# Patient Record
Sex: FEMALE | Race: WHITE | Employment: FULL TIME | URBAN - METROPOLITAN AREA
[De-identification: names, ages, dates, MRNs, and addresses within clinical notes are randomized per-mention and may not be internally consistent; named-entity substitution may affect disease eponyms.]

---

## 2017-08-19 ENCOUNTER — HOSPITAL ENCOUNTER (OUTPATIENT)
Dept: MAMMOGRAPHY | Facility: HOSPITAL | Age: 57
Discharge: HOME | End: 2017-08-19
Attending: OBSTETRICS & GYNECOLOGY
Payer: COMMERCIAL

## 2017-08-19 DIAGNOSIS — Z12.39 SCREENING BREAST EXAMINATION: ICD-10-CM

## 2017-08-19 PROCEDURE — 77067 SCR MAMMO BI INCL CAD: CPT | Performed by: OBSTETRICS & GYNECOLOGY

## 2017-09-30 ENCOUNTER — HOSPITAL ENCOUNTER (OUTPATIENT)
Dept: CT IMAGING | Facility: HOSPITAL | Age: 57
End: 2017-09-30
Attending: UROLOGY
Payer: COMMERCIAL

## 2017-09-30 ENCOUNTER — HOSPITAL ENCOUNTER (OUTPATIENT)
Dept: CT IMAGING | Facility: HOSPITAL | Age: 57
Discharge: HOME | End: 2017-09-30
Attending: UROLOGY
Payer: COMMERCIAL

## 2017-09-30 DIAGNOSIS — R10.9 LEFT FLANK PAIN: ICD-10-CM

## 2017-09-30 DIAGNOSIS — N20.0 RENAL CALCULUS, LEFT: ICD-10-CM

## 2017-09-30 PROCEDURE — 74176 CT ABD & PELVIS W/O CONTRAST: CPT | Performed by: UROLOGY

## 2018-06-05 ENCOUNTER — HOSPITAL ENCOUNTER (OUTPATIENT)
Dept: MAMMOGRAPHY | Facility: HOSPITAL | Age: 58
Discharge: HOME | End: 2018-06-05
Attending: SURGERY
Payer: COMMERCIAL

## 2018-06-05 DIAGNOSIS — N64.4 BREAST PAIN, LEFT: ICD-10-CM

## 2018-06-05 PROCEDURE — 77061 BREAST TOMOSYNTHESIS UNI: CPT | Performed by: SURGERY

## 2018-06-05 PROCEDURE — 76641 ULTRASOUND BREAST COMPLETE: CPT | Performed by: SURGERY

## 2018-06-05 PROCEDURE — 77065 DX MAMMO INCL CAD UNI: CPT | Performed by: SURGERY

## 2018-08-02 PROCEDURE — 88305 TISSUE EXAM BY PATHOLOGIST: CPT | Performed by: SURGERY

## 2019-02-18 PROBLEM — Z87.442 HISTORY OF KIDNEY STONES: Status: ACTIVE | Noted: 2019-02-18

## 2019-02-18 PROCEDURE — 87086 URINE CULTURE/COLONY COUNT: CPT | Performed by: OBSTETRICS & GYNECOLOGY

## 2019-05-03 ENCOUNTER — HOSPITAL ENCOUNTER (OUTPATIENT)
Dept: MAMMOGRAPHY | Facility: HOSPITAL | Age: 59
Discharge: HOME | End: 2019-05-03
Attending: OBSTETRICS & GYNECOLOGY
Payer: COMMERCIAL

## 2019-05-03 DIAGNOSIS — N63.0 LUMP OR MASS IN BREAST: ICD-10-CM

## 2019-05-03 PROCEDURE — 77062 BREAST TOMOSYNTHESIS BI: CPT | Performed by: OBSTETRICS & GYNECOLOGY

## 2019-05-03 PROCEDURE — 77065 DX MAMMO INCL CAD UNI: CPT | Performed by: OBSTETRICS & GYNECOLOGY

## 2019-05-03 PROCEDURE — 76642 ULTRASOUND BREAST LIMITED: CPT | Performed by: OBSTETRICS & GYNECOLOGY

## 2019-05-03 PROCEDURE — 77066 DX MAMMO INCL CAD BI: CPT | Performed by: OBSTETRICS & GYNECOLOGY

## 2019-05-03 PROCEDURE — 77061 BREAST TOMOSYNTHESIS UNI: CPT | Performed by: OBSTETRICS & GYNECOLOGY

## 2020-06-24 ENCOUNTER — APPOINTMENT (OUTPATIENT)
Dept: LAB | Age: 60
End: 2020-06-24
Attending: OBSTETRICS & GYNECOLOGY
Payer: COMMERCIAL

## 2020-06-24 DIAGNOSIS — R63.5 UNINTENDED WEIGHT GAIN: ICD-10-CM

## 2020-06-24 PROCEDURE — 87624 HPV HI-RISK TYP POOLED RSLT: CPT | Performed by: NURSE PRACTITIONER

## 2020-06-24 PROCEDURE — 88175 CYTOPATH C/V AUTO FLUID REDO: CPT | Performed by: NURSE PRACTITIONER

## 2020-06-24 PROCEDURE — 36415 COLL VENOUS BLD VENIPUNCTURE: CPT

## 2020-06-24 PROCEDURE — 84443 ASSAY THYROID STIM HORMONE: CPT

## 2020-06-25 ENCOUNTER — HOSPITAL ENCOUNTER (OUTPATIENT)
Dept: MAMMOGRAPHY | Age: 60
Discharge: HOME | End: 2020-06-25
Attending: NURSE PRACTITIONER
Payer: COMMERCIAL

## 2020-06-25 DIAGNOSIS — Z12.31 ENCOUNTER FOR SCREENING MAMMOGRAM FOR MALIGNANT NEOPLASM OF BREAST: ICD-10-CM

## 2020-06-25 PROCEDURE — 77067 SCR MAMMO BI INCL CAD: CPT | Performed by: NURSE PRACTITIONER

## 2020-06-25 PROCEDURE — 77063 BREAST TOMOSYNTHESIS BI: CPT | Performed by: NURSE PRACTITIONER

## 2020-07-02 ENCOUNTER — HOSPITAL ENCOUNTER (OUTPATIENT)
Dept: MAMMOGRAPHY | Facility: HOSPITAL | Age: 60
Discharge: HOME | End: 2020-07-02
Attending: NURSE PRACTITIONER
Payer: COMMERCIAL

## 2020-07-02 DIAGNOSIS — R92.2 INCONCLUSIVE MAMMOGRAM: ICD-10-CM

## 2020-07-02 PROCEDURE — 77061 BREAST TOMOSYNTHESIS UNI: CPT | Performed by: NURSE PRACTITIONER

## 2020-07-02 PROCEDURE — 77065 DX MAMMO INCL CAD UNI: CPT | Performed by: NURSE PRACTITIONER

## 2020-07-02 PROCEDURE — 76642 ULTRASOUND BREAST LIMITED: CPT | Performed by: NURSE PRACTITIONER

## 2021-01-31 ENCOUNTER — IMMUNIZATION (OUTPATIENT)
Dept: LAB | Facility: HOSPITAL | Age: 61
End: 2021-01-31
Attending: EMERGENCY MEDICINE
Payer: COMMERCIAL

## 2021-01-31 DIAGNOSIS — Z23 NEED FOR VACCINATION: Primary | ICD-10-CM

## 2021-01-31 PROCEDURE — 0011A SARSCOV2 VAC 100MCG/0.5ML IM: CPT

## 2021-02-28 ENCOUNTER — IMMUNIZATION (OUTPATIENT)
Dept: LAB | Facility: HOSPITAL | Age: 61
End: 2021-02-28
Attending: EMERGENCY MEDICINE
Payer: COMMERCIAL

## 2021-02-28 DIAGNOSIS — Z23 NEED FOR VACCINATION: Primary | ICD-10-CM

## 2021-02-28 PROCEDURE — 0012A SARSCOV2 VAC 100MCG/0.5ML IM: CPT

## 2021-04-13 PROBLEM — R10.13 EPIGASTRIC PAIN: Status: ACTIVE | Noted: 2021-04-13

## 2021-04-13 PROBLEM — Z86.0100 PERSONAL HISTORY OF COLONIC POLYPS: Status: ACTIVE | Noted: 2021-04-13

## 2021-04-13 PROBLEM — R12 HEARTBURN: Status: ACTIVE | Noted: 2021-04-13

## 2021-04-13 PROBLEM — Z86.010 PERSONAL HISTORY OF COLONIC POLYPS: Status: ACTIVE | Noted: 2021-04-13

## 2021-04-13 PROBLEM — R11.2 NAUSEA AND VOMITING: Status: ACTIVE | Noted: 2021-04-13

## 2021-05-04 ENCOUNTER — HOSPITAL ENCOUNTER (OUTPATIENT)
Dept: MAMMOGRAPHY | Facility: HOSPITAL | Age: 61
Discharge: HOME | End: 2021-05-04
Attending: OBSTETRICS & GYNECOLOGY
Payer: COMMERCIAL

## 2021-05-04 DIAGNOSIS — N63.10 BILATERAL BREAST LUMP: ICD-10-CM

## 2021-05-04 DIAGNOSIS — N63.20 BILATERAL BREAST LUMP: ICD-10-CM

## 2021-05-04 PROCEDURE — 77066 DX MAMMO INCL CAD BI: CPT | Performed by: OBSTETRICS & GYNECOLOGY

## 2021-05-04 PROCEDURE — 77062 BREAST TOMOSYNTHESIS BI: CPT | Performed by: OBSTETRICS & GYNECOLOGY

## 2021-05-04 PROCEDURE — 76642 ULTRASOUND BREAST LIMITED: CPT | Performed by: OBSTETRICS & GYNECOLOGY

## 2021-06-11 ENCOUNTER — LAB ENCOUNTER (OUTPATIENT)
Dept: LAB | Facility: HOSPITAL | Age: 61
End: 2021-06-11
Attending: NURSE PRACTITIONER
Payer: COMMERCIAL

## 2021-06-11 DIAGNOSIS — Z01.818 PREOP EXAMINATION: ICD-10-CM

## 2021-06-14 PROBLEM — Z86.0101 HISTORY OF ADENOMATOUS POLYP OF COLON: Status: ACTIVE | Noted: 2021-06-14

## 2021-06-14 PROBLEM — K29.30 CHRONIC SUPERFICIAL GASTRITIS WITHOUT BLEEDING: Status: ACTIVE | Noted: 2021-06-14

## 2021-06-14 PROBLEM — K31.7 POLYP OF STOMACH AND DUODENUM: Status: ACTIVE | Noted: 2021-06-14

## 2021-06-14 PROBLEM — K21.00 GASTRO-ESOPHAGEAL REFLUX DISEASE WITH ESOPHAGITIS, WITHOUT BLEEDING: Status: ACTIVE | Noted: 2021-06-14

## 2021-06-14 PROBLEM — Z80.0 FAMILY HISTORY OF COLON CANCER: Status: ACTIVE | Noted: 2021-06-14

## 2021-06-14 PROBLEM — R12 CHRONIC HEARTBURN: Status: ACTIVE | Noted: 2021-06-14

## 2021-06-14 PROBLEM — D12.3 BENIGN NEOPLASM OF TRANSVERSE COLON: Status: ACTIVE | Noted: 2021-06-14

## 2021-06-14 PROBLEM — Z86.010 HISTORY OF ADENOMATOUS POLYP OF COLON: Status: ACTIVE | Noted: 2021-06-14

## 2021-06-14 PROBLEM — R10.13 ABDOMINAL PAIN, EPIGASTRIC: Status: ACTIVE | Noted: 2021-06-14

## 2021-06-14 PROBLEM — D12.2 BENIGN NEOPLASM OF ASCENDING COLON: Status: ACTIVE | Noted: 2021-06-14

## 2021-06-14 PROBLEM — D12.4 BENIGN NEOPLASM OF DESCENDING COLON: Status: ACTIVE | Noted: 2021-06-14

## 2021-06-29 ENCOUNTER — HOSPITAL ENCOUNTER (OUTPATIENT)
Dept: BONE DENSITY | Age: 61
Discharge: HOME | End: 2021-06-29
Attending: NURSE PRACTITIONER
Payer: COMMERCIAL

## 2021-06-29 DIAGNOSIS — Z13.820 SCREENING FOR OSTEOPOROSIS: ICD-10-CM

## 2021-06-29 PROCEDURE — 77080 DXA BONE DENSITY AXIAL: CPT | Performed by: NURSE PRACTITIONER

## 2021-08-13 ENCOUNTER — LAB ENCOUNTER (OUTPATIENT)
Dept: LAB | Facility: HOSPITAL | Age: 61
End: 2021-08-13
Attending: INTERNAL MEDICINE
Payer: COMMERCIAL

## 2021-08-13 DIAGNOSIS — Z01.818 PRE-OP TESTING: ICD-10-CM

## 2021-08-13 LAB — SARS-COV-2 RNA RESP QL NAA+PROBE: NOT DETECTED

## 2022-01-06 ENCOUNTER — HOSPITAL ENCOUNTER (OUTPATIENT)
Dept: MAMMOGRAPHY | Facility: HOSPITAL | Age: 62
Discharge: HOME | End: 2022-01-06
Attending: OBSTETRICS & GYNECOLOGY
Payer: COMMERCIAL

## 2022-01-06 DIAGNOSIS — N63.15 BREAST LUMP ON RIGHT SIDE AT 6 O'CLOCK POSITION: ICD-10-CM

## 2022-01-06 PROCEDURE — 77065 DX MAMMO INCL CAD UNI: CPT | Performed by: OBSTETRICS & GYNECOLOGY

## 2022-01-06 PROCEDURE — 77061 BREAST TOMOSYNTHESIS UNI: CPT | Performed by: OBSTETRICS & GYNECOLOGY

## 2022-01-14 NOTE — PROGRESS NOTES
Message left on voice mail to affirm that she has seen message to return for repeat breast exam in 3-4 months.

## 2022-06-22 ENCOUNTER — HOSPITAL ENCOUNTER (OUTPATIENT)
Dept: MAMMOGRAPHY | Facility: HOSPITAL | Age: 62
Discharge: HOME | End: 2022-06-22
Attending: NURSE PRACTITIONER
Payer: COMMERCIAL

## 2022-06-22 DIAGNOSIS — Z12.31 ENCOUNTER FOR SCREENING MAMMOGRAM FOR BREAST CANCER: ICD-10-CM

## 2022-06-22 PROCEDURE — 77067 SCR MAMMO BI INCL CAD: CPT | Performed by: NURSE PRACTITIONER

## 2022-06-22 PROCEDURE — 77063 BREAST TOMOSYNTHESIS BI: CPT | Performed by: NURSE PRACTITIONER

## 2023-01-06 PROCEDURE — 87086 URINE CULTURE/COLONY COUNT: CPT

## 2023-05-18 PROCEDURE — 87624 HPV HI-RISK TYP POOLED RSLT: CPT

## 2023-05-18 PROCEDURE — 88175 CYTOPATH C/V AUTO FLUID REDO: CPT

## 2023-06-23 ENCOUNTER — HOSPITAL ENCOUNTER (OUTPATIENT)
Dept: MAMMOGRAPHY | Facility: HOSPITAL | Age: 63
Discharge: HOME | End: 2023-06-23
Payer: COMMERCIAL

## 2023-06-23 DIAGNOSIS — Z12.31 ENCOUNTER FOR SCREENING MAMMOGRAM FOR MALIGNANT NEOPLASM OF BREAST: ICD-10-CM

## 2023-06-23 PROCEDURE — 77067 SCR MAMMO BI INCL CAD: CPT

## 2023-06-23 PROCEDURE — 77063 BREAST TOMOSYNTHESIS BI: CPT

## 2024-04-09 ENCOUNTER — TELEPHONE (OUTPATIENT)
Facility: CLINIC | Age: 64
End: 2024-04-09

## 2024-04-09 DIAGNOSIS — Z12.31 SCREENING MAMMOGRAM FOR BREAST CANCER: Primary | ICD-10-CM

## 2024-04-09 NOTE — TELEPHONE ENCOUNTER
Nya Dasilva,    Your screening mammogram is ordered. That is to be done after your annual exam.    Doctor STEEL

## 2024-05-22 ENCOUNTER — OFFICE VISIT (OUTPATIENT)
Facility: CLINIC | Age: 64
End: 2024-05-22

## 2024-05-22 VITALS
HEIGHT: 64 IN | BODY MASS INDEX: 28.34 KG/M2 | WEIGHT: 166 LBS | SYSTOLIC BLOOD PRESSURE: 122 MMHG | DIASTOLIC BLOOD PRESSURE: 76 MMHG

## 2024-05-22 DIAGNOSIS — Z00.00 ANNUAL PHYSICAL EXAM: ICD-10-CM

## 2024-05-22 DIAGNOSIS — Z12.6 BLADDER CANCER SCREENING: Primary | ICD-10-CM

## 2024-05-22 DIAGNOSIS — N95.2 VAGINAL ATROPHY: ICD-10-CM

## 2024-05-22 DIAGNOSIS — N84.1 ENDOCERVICAL POLYP: ICD-10-CM

## 2024-05-22 LAB
BILIRUBIN: NEGATIVE
GLUCOSE (URINE DIPSTICK): NEGATIVE MG/DL
KETONES (URINE DIPSTICK): NEGATIVE MG/DL
LEUKOCYTES: NEGATIVE
NITRITE, URINE: NEGATIVE
OCCULT BLOOD: NEGATIVE
PH, URINE: 7 (ref 4.5–8)
PROTEIN (URINE DIPSTICK): NEGATIVE MG/DL
SPECIFIC GRAVITY: 1.01 (ref 1–1.03)
UROBILINOGEN,SEMI-QN: 0.2 MG/DL (ref 0–1.9)

## 2024-05-22 PROCEDURE — 87624 HPV HI-RISK TYP POOLED RSLT: CPT | Performed by: OBSTETRICS & GYNECOLOGY

## 2024-05-22 PROCEDURE — 99396 PREV VISIT EST AGE 40-64: CPT | Performed by: OBSTETRICS & GYNECOLOGY

## 2024-05-22 PROCEDURE — 88175 CYTOPATH C/V AUTO FLUID REDO: CPT | Performed by: OBSTETRICS & GYNECOLOGY

## 2024-05-22 PROCEDURE — 81003 URINALYSIS AUTO W/O SCOPE: CPT | Performed by: OBSTETRICS & GYNECOLOGY

## 2024-05-22 NOTE — PROGRESS NOTES
GYN H&P     Genetic questionnaire reviewed with the patient and she will be referred for genetic counseling if the questionnaire had any positive results.    The [facility] intake form was also reviewed regarding contraception, menstrual periods, urinary health, and vaginal / sexual health    2024  12:31 PM    Chief Complaint   Patient presents with    Physical     No concerns.       HPI: Nya is a 64 year old  Patient's last menstrual period was 2011.  (contraception: na) here for her annual gyn exam.     She has no complaints.  Not using E cream much - hardly sexually active.  No VV complaints  Denies any pelvic or breast complaints.      Second DEXA also normal    Feels something in right SBE - not a lump.     Previous encounters and chart reviewed.     OB History    Para Term  AB Living   1 1 0 1   1   SAB IAB Ectopic Multiple Live Births         1 2      # Outcome Date GA Lbr Oswaldo/2nd Weight Sex Type Anes PTL Lv   1A  90 36w0d  5 lb 1 oz (2.296 kg) F NORMAL SPONT   HOME   1B  90 36w0d  5 lb 4 oz (2.381 kg) F NORMAL SPONT   DEC       GYN hx:   Menarche: 13  Use of Birth Control (if yes, specify type): Postmenopausal  Pap Date: 23  Pap Result Notes: neg/neg 2020 Neg/Neg      ( neg/neg,  neg/neg,  neg)  Follow Up Recommendation:       Past Medical History:    Abdominal hernia    CT scan very small hiatal hernia    Arthritis    Large toe joint replacement both feet    Bloating    Sometimes after meals    Calculus of kidney    Several kidney stone surgeries over the years    Chronic cough    Sometimes after eating    Decorative tattoo    Left wrist    Diarrhea, unspecified    Sometimes    Heartburn    After meals    Indigestion    Severe pain behind breastbone    Kidney stones    Nausea    Sometimes after meals    Sleep disturbance    Sometimes    Vomiting    Sometimes after meals    Wears glasses    Glasses     Past  Surgical History:   Procedure Laterality Date    Cholecystectomy  2009    Surgery    Colonoscopy N/A 8/2/2018    Procedure: COLONOSCOPY, POSSIBLE BIOPSY, POSSIBLE POLYPECTOMY 13665;  Surgeon: MILVIA Smith MD;  Location: [facility]    Colonoscopy  06/14/2021    endoscopy also    Colonoscopy,biopsy N/A 8/6/2015    Procedure: COLONOSCOPY, POSSIBLE BIOPSY, POSSIBLE POLYPECTOMY 17364;  Surgeon: MILVIA Smith MD;  Location: [facility]    Lithotripsy Right 03/25/2021    Casey biopsy stereo nodule 2 site bilat (cpt=19081/99399)  2010    BENIGN    Casey biopsy stereo nodule 2 site left (cpt=19081/24997)  2008    benign    Other surgical history Right 03/29/2021    kidney stone remnant removal    Reduction left  12/12'    Reduction right  12/12'    Removal gallbladder       Allergies   Allergen Reactions    Pseudoephedrine RASH     Current Outpatient Medications on File Prior to Visit   Medication Sig Dispense Refill    Multiple Vitamin (ONE-DAILY MULTI VITAMINS) Oral Tab Take 1 tablet by mouth daily.      Cyanocobalamin (VITAMIN B-12 OR) Take by mouth.      Ascorbic Acid (VITAMIN C OR) Take by mouth.      Cholecalciferol 125 MCG (5000 UT) Oral Tab Take 1 tablet (5,000 Units total) by mouth daily.      PEG 3350-KCl-Na Bicarb-NaCl 420 g Oral Recon Soln Take as directed by physician (Patient not taking: Reported on 5/22/2024) 4000 mL 0    methylPREDNISolone 4 MG Oral Tablet Therapy Pack Take 1 tablet by mouth As Directed.      estradiol 0.1 MG/GM Vaginal Cream Apply a dime-sized amount to the vaginal introitus every day for 2 weeks, then reduce dose to 2 times per week. 42.5 g 1    Cyanocobalamin (B-12 COMPLIANCE INJECTION IJ) Inject as directed every 14 (fourteen) days.       No current facility-administered medications on file prior to visit.     Family History   Problem Relation Age of Onset    Cancer Father         Bladder, Bone and Prostate Ca    Prostate Cancer Father         60    Hypertension Father         50    Cancer  Mother 63        Colon Ca    Colon Cancer Mother         63    Diabetes Mother         diabetes    Heart Attack Mother         45    Cancer Paternal Aunt 52        Breast CA    Breast Cancer Paternal Aunt         unknown    Cancer Paternal Aunt 65        Breast Ca    Cancer Paternal Cousin 55        Breast Ca    Diabetes Paternal Grandmother         diabetes    Hypertension Brother         50    Heart Attack Brother         60    Breast Cancer Paternal Cousin Female 55     Social History     Socioeconomic History    Marital status:      Spouse name: Not on file    Number of children: Not on file    Years of education: Not on file    Highest education level: Not on file   Occupational History    Not on file   Tobacco Use    Smoking status: Never    Smokeless tobacco: Never    Tobacco comments:     never smoked   Vaping Use    Vaping status: Never Used   Substance and Sexual Activity    Alcohol use: Yes     Comment: monthly    Drug use: No    Sexual activity: Yes     Partners: Male   Other Topics Concern    Not on file   Social History Narrative    Not on file     Social Determinants of Health     Financial Resource Strain: Not on file   Food Insecurity: Not on file   Transportation Needs: Not on file   Physical Activity: Not on file   Stress: Not on file   Social Connections: Unknown (3/14/2021)    [facility]    Social Connections     Conversations with friends/family/neighbors per week: Not on file   Housing Stability: Low Risk  (7/7/2021)    [facility]    Housing Stability     Mortgage Payment Concerns?: Not on file     Number of Places Lived in the Last Year: Not on file     Unstable Housing?: Not on file       ROS:     Review of Systems:  General: denies fevers, chills, fatigue and malaise.   Eyes: no visual changes, denies headaches  ENT: no complaints, denies earaches, runny  nose, epistaxis, throat pain or sore throat  Respiratory: denies SOB, dyspnea, cough or wheezing  Cardiovascular: denies chest pain, palpitations, exercise intolerance   GI: denies abdominal pain, diarrhea, constipation  : no complaints, denies dysuria, increased urinary frequency. Menses regular, no dysmenorrhea, no menorrhagia, no dyspareunia   Hematological/lymphatic: denies history of excessive bleeding or bruising, denies dizziness, lightheadedness.   Breast: denies rashes, skin changes, pain, lumps or discharge   Psychiatric: denies depression, changes in sleep patterns, anxiety  Endocrine: denies hot or cold intolerance, mood changes   Neurological: denies changes in sight, smell, hearing or taste. Denies seizures or tremors  Immunological: denies allergies, denies anaphylaxis, or swollen lymph nodes  Musculoskeletal: denies joint pain, morning stiffness, decreased range of motion         O /76   Ht 64\"   Wt 166 lb (75.3 kg)   LMP 05/30/2011   BMI 28.49 kg/m²         Wt Readings from Last 6 Encounters:   05/22/24 166 lb (75.3 kg)   05/18/23 177 lb (80.3 kg)   01/06/23 170 lb (77.1 kg)   05/17/22 164 lb (74.4 kg)   12/21/21 176 lb (79.8 kg)   07/28/21 181 lb 3.2 oz (82.2 kg)     Exam:   GENERAL: well developed, well nourished, in no apparent distress, oriented.  SKIN: no rashes, no suspicious lesions  HEENT: normal  NECK: supple; no thyromegaly, no adenopathy  LUNGS: clear to auscultation  CARDIOVASCULAR: normal S1, S2, RRR  BREASTS: Carmine scars - soft, nontender, no palpable masses or nodes, no nipple discharge, no skin changes, no axillary adenopathy  ABDOMEN:  scars,  soft, non distended; non tender, no masses  PELVIC: External Genitalia: Normal appearing, no lesions.    Vagina: normal pink mucosa, no lesions, normal clear discharge.    Bladder well supported.  No  anterior or posterior hernias    Cervix: nulliparous - small endocervical polyp, no lesions , No CMT     Uterus: AVAF, mobile, non  tender, normal size    Adnexa: non tender, no masses, normal size    Rectal: deferred  EXTREMITIES:  non tender without edema        A/P: Patient is 64 year old female with no complaints. Here for well woman exam.            Patient counseled on:    Diet/exercise.      Self Breast Exams     Safe sex practices / and living environment     Vaccines:  Annual Flu, Tdap +/- Gardasil no recommended (up to 45 yrs).      Pneumococcal at 65 yrs old, Shingles at 60 yrs old          Pap: neg/neg - Year:  2023  GC/Chlamydia:  na  Mammogram: negative, 2023 - reordered    Dexa:  two both normal last one 2021 - repeat in 15 years  Colonoscopy - benign polyps in past, repeat 2024.  Lipid / Cholesterol:    Component  Ref Range & Units 11/23/21  8:56 AM   Total Cholesterol  0 - 199 mg/dL 203 High    Triglycerides  0.00 - 150.00 mg/dL 118   Comment: NCEP Reference Values for Triglycerides:  Normal:             <150 mg/dL  Borderline High:    150 - 199 mg/dL  High:               200 - 499 mg/dL  Very High:          >/= 500 mg/dL   HDL Cholesterol  >40 mg/dL 68   LDL Cholesterol  0 - 99 mg/dL 111 High    Comment: Cutoff values recommended by the National Cholesterol Education Program:  DESIRABLE:    Cholesterol <200 mg/dL           LDL <100 mg/dL  BORDERLINE:   Cholesterol 200-239 mg/dL        -159 mg/dL  HIGHER RISK:  Cholesterol >240 mg/dL           LDL >160 mg/dL, HDL <40 mg/dL   Non-HDL Cholesterol  No Reference Range mg/dL 135   Comment: A reasonable goal for non-HDL cholesterol is one that is 30 mg/dL higher than the LDL cholesterol goal.   CHOL/HDL Ratio  0.0 - 5.0 3.0          Meds This Visit:    Requested Prescriptions      No prescriptions requested or ordered in this encounter       1. Bladder cancer screening  - Urinalysis [97662]    2. Endocervical polyp  - ThinPrep PAP Smear; Future  - Hpv High Risk , Thin Prep Collection; Future    3. Annual physical exam    4. Vaginal atrophy      Return in about 2 weeks (around  6/5/2024) for polpectomy.    Jarrett Ly MD   5/22/2024  12:31 PM

## 2024-05-23 LAB — HPV I/H RISK 1 DNA SPEC QL NAA+PROBE: NEGATIVE

## 2024-05-28 LAB
.: NORMAL
.: NORMAL

## 2024-06-05 PROBLEM — D12.0 BENIGN NEOPLASM OF CECUM: Status: ACTIVE | Noted: 2024-06-05

## 2024-06-11 ENCOUNTER — OFFICE VISIT (OUTPATIENT)
Facility: CLINIC | Age: 64
End: 2024-06-11
Payer: COMMERCIAL

## 2024-06-11 VITALS
SYSTOLIC BLOOD PRESSURE: 122 MMHG | WEIGHT: 167 LBS | BODY MASS INDEX: 28.51 KG/M2 | DIASTOLIC BLOOD PRESSURE: 68 MMHG | HEIGHT: 64 IN

## 2024-06-11 DIAGNOSIS — Z32.02 PREGNANCY EXAMINATION OR TEST, NEGATIVE RESULT: Primary | ICD-10-CM

## 2024-06-11 DIAGNOSIS — N84.1 MUCOUS POLYP OF CERVIX: ICD-10-CM

## 2024-06-11 LAB
CONTROL LINE PRESENT WITH A CLEAR BACKGROUND (YES/NO): YES YES/NO
KIT LOT #: NORMAL NUMERIC
PREGNANCY TEST, URINE: NEGATIVE

## 2024-06-11 PROCEDURE — 88305 TISSUE EXAM BY PATHOLOGIST: CPT | Performed by: OBSTETRICS & GYNECOLOGY

## 2024-06-11 PROCEDURE — 81025 URINE PREGNANCY TEST: CPT | Performed by: OBSTETRICS & GYNECOLOGY

## 2024-06-11 PROCEDURE — 57500 BIOPSY OF CERVIX: CPT | Performed by: OBSTETRICS & GYNECOLOGY

## 2024-06-11 NOTE — PROGRESS NOTES
Polypectomy    See note from 5/22/24     Pap:  unsatisfactory/neg    Counseled regarding the procedure and the consent was signed    Urine pregnancy test was negative    Pre. Op. Dx:  endo-cervical polyp  Post. O. Dx:  Same    Procedure: The speculum was introduced into the vagina and the area was prepped with a Betadine solution.  The polyp was identified and grasped with the ring forceps and twisted to remove.  Bleeding was minimal patient tolerated the procedure well    Surgeon:  Aliyah  Anesthesia:  none    EBL:  minimal    Complications: none    Routine post procedure counseling    Fu one year for repeat pap.    Aliyah  6/11/24

## 2024-06-13 ENCOUNTER — MED REC SCAN ONLY (OUTPATIENT)
Facility: CLINIC | Age: 64
End: 2024-06-13

## 2024-06-19 ENCOUNTER — OFFICE VISIT (OUTPATIENT)
Facility: CLINIC | Age: 64
End: 2024-06-19

## 2024-06-19 VITALS
BODY MASS INDEX: 28.34 KG/M2 | WEIGHT: 166 LBS | HEIGHT: 64 IN | SYSTOLIC BLOOD PRESSURE: 96 MMHG | DIASTOLIC BLOOD PRESSURE: 64 MMHG

## 2024-06-19 DIAGNOSIS — R35.0 URINARY FREQUENCY: Primary | ICD-10-CM

## 2024-06-19 DIAGNOSIS — R39.15 URGENCY OF URINATION: ICD-10-CM

## 2024-06-19 LAB
BILIRUBIN: NEGATIVE
GLUCOSE (URINE DIPSTICK): NEGATIVE MG/DL
KETONES (URINE DIPSTICK): NEGATIVE MG/DL
LEUKOCYTES: NEGATIVE
NITRITE, URINE: NEGATIVE
OCCULT BLOOD: NEGATIVE
PH, URINE: 5 (ref 4.5–8)
PROTEIN (URINE DIPSTICK): NEGATIVE MG/DL
SPECIFIC GRAVITY: 1.02 (ref 1–1.03)
UROBILINOGEN,SEMI-QN: 0.2 MG/DL (ref 0–1.9)

## 2024-06-19 PROCEDURE — 87086 URINE CULTURE/COLONY COUNT: CPT | Performed by: OBSTETRICS & GYNECOLOGY

## 2024-06-19 PROCEDURE — 99213 OFFICE O/P EST LOW 20 MIN: CPT | Performed by: OBSTETRICS & GYNECOLOGY

## 2024-06-19 PROCEDURE — 81003 URINALYSIS AUTO W/O SCOPE: CPT | Performed by: OBSTETRICS & GYNECOLOGY

## 2024-06-19 RX ORDER — NITROFURANTOIN 25; 75 MG/1; MG/1
100 CAPSULE ORAL 2 TIMES DAILY
Qty: 14 CAPSULE | Refills: 0 | Status: SHIPPED | OUTPATIENT
Start: 2024-06-19 | End: 2024-06-26

## 2024-06-19 NOTE — PROGRESS NOTES
Gynecology Office Visit      Nya Rashid is a 64 year old female  Patient's last menstrual period was 2011.      HPI:     Chief Complaint   Patient presents with    Urinary Frequency     CO urinary frequency and urgency x 2 days. CO burning sensation.  Pt had a polypectomy on 2024.     Pt presents with increased urinary frequency and urgency. No fever.  UA neg          Chart and previous encounters reviewed.  HISTORY:  Past Medical History:    Abdominal hernia    CT scan very small hiatal hernia    Arthritis    Large toe joint replacement both feet    Bloating    Sometimes after meals    Calculus of kidney    Several kidney stone surgeries over the years    Chronic cough    Sometimes after eating    Decorative tattoo    Left wrist    Diarrhea, unspecified    Sometimes    Heartburn    After meals    Indigestion    Severe pain behind breastbone    Kidney stones    Nausea    Sometimes after meals    Sleep disturbance    Sometimes    Vomiting    Sometimes after meals    Wears glasses    Glasses      Past Surgical History:   Procedure Laterality Date    Cholecystectomy  2009    Surgery    Colonoscopy N/A 2018    Procedure: COLONOSCOPY, POSSIBLE BIOPSY, POSSIBLE POLYPECTOMY 92844;  Surgeon: MILVIA Smith MD;  Location: [facility]    Colonoscopy  2021    endoscopy also    Colonoscopy,biopsy N/A 2015    Procedure: COLONOSCOPY, POSSIBLE BIOPSY, POSSIBLE POLYPECTOMY 13175;  Surgeon: MILVIA Smith MD;  Location: [facility]    Lithotripsy Right 2021    Csaey biopsy stereo nodule 2 site bilat (cpt=19081/65380)      BENIGN    Casey biopsy stereo nodule 2 site left (cpt=19081/97001)      benign    Other surgical history Right 2021    kidney stone remnant removal    Reduction left  '    Reduction right  '    Removal gallbladder        Family History   Problem Relation Age of Onset    Cancer Father         Bladder, Bone and Prostate Ca    Prostate Cancer Father          60    Hypertension Father         50    Cancer Mother 63        Colon Ca    Colon Cancer Mother         63    Diabetes Mother         diabetes    Heart Attack Mother         45    Cancer Paternal Aunt 52        Breast CA    Breast Cancer Paternal Aunt         unknown    Cancer Paternal Aunt 65        Breast Ca    Cancer Paternal Cousin 55        Breast Ca    Diabetes Paternal Grandmother         diabetes    Hypertension Brother         50    Heart Attack Brother         60    Breast Cancer Paternal Cousin Female 55      Social History:   Social History     Socioeconomic History    Marital status:    Tobacco Use    Smoking status: Never    Smokeless tobacco: Never    Tobacco comments:     never smoked   Vaping Use    Vaping status: Never Used   Substance and Sexual Activity    Alcohol use: Yes     Alcohol/week: 1.0 standard drink of alcohol     Types: 1 Glasses of wine per week     Comment: monthly    Drug use: Never    Sexual activity: Yes     Partners: Male     Social Determinants of Health      [facility]    Social Connections    [facility]    Housing Stability        Medications (Active prior to today's visit):  Current Outpatient Medications   Medication Sig Dispense Refill    nitrofurantoin monohydrate macro (MACROBID) 100 MG Oral Cap Take 1 capsule (100 mg total) by mouth 2 (two) times daily for 7 days. 14 capsule 0    Multiple Vitamin (ONE-DAILY MULTI VITAMINS) Oral Tab Take 1 tablet by mouth daily.      Cyanocobalamin (VITAMIN B-12 OR) Take by mouth.      Ascorbic Acid (VITAMIN C OR) Take by mouth.      Cholecalciferol 125 MCG (5000 UT) Oral Tab Take 1 tablet (5,000 Units total) by mouth daily.      methylPREDNISolone 4 MG Oral Tablet Therapy Pack Take 1 tablet by mouth As Directed.      estradiol 0.1 MG/GM Vaginal Cream Apply a dime-sized amount to the vaginal  introitus every day for 2 weeks, then reduce dose to 2 times per week. 42.5 g 1    Cyanocobalamin (B-12 COMPLIANCE INJECTION IJ) Inject as directed every 14 (fourteen) days.         Allergies:  Allergies   Allergen Reactions    Pseudoephedrine RASH       Gyn:  Menarche: 13  Use of Birth Control (if yes, specify type): Postmenopausal  Pap Date: 23  Pap Result Notes: 23 neg/neg 2020 Neg/Neg      ( neg/neg,  neg/neg,  neg)  Follow Up Recommendation:     OB Hx:  OB History    Para Term  AB Living   1 1 0 1   1   SAB IAB Ectopic Multiple Live Births         1 2      # Outcome Date GA Lbr Oswaldo/2nd Weight Sex Type Anes PTL Lv   1A  90 36w0d  5 lb 1 oz (2.296 kg) F NORMAL SPONT   HOME   1B  90 36w0d  5 lb 4 oz (2.381 kg) F NORMAL SPONT   DEC         ROS:     10 point ROS completed and was negative, except for pertinent positive and negatives stated in the HPI.    PHYSICAL EXAM:   BP 96/64   Ht 64\"   Wt 166 lb (75.3 kg)   LMP 2011   BMI 28.49 kg/m²      Wt Readings from Last 6 Encounters:   24 166 lb (75.3 kg)   24 167 lb (75.8 kg)   24 166 lb (75.3 kg)   24 166 lb (75.3 kg)   23 177 lb (80.3 kg)   23 170 lb (77.1 kg)        Gen:  Oriented, in no acute distress  Abdomen: +suprapubic tenderness    Pelvic:      External Genitalia: Normal appearing, no lesions.  Cervix: ,No CMT   Uterus: mobile, non tender, + bladder tenderness  Adnexa: non tender, no masses  Rectal: deferred       ASSESSMENT/PLAN:     1. Urinary frequency  - Urinalysis [92594]  - Culture Urine; Future    2. Urgency of urination      Probable UTI, will begin macrobid  100mg bid x 7 days  Await urine culture    Meds This Visit:  Requested Prescriptions     Signed Prescriptions Disp Refills    nitrofurantoin monohydrate macro (MACROBID) 100 MG Oral Cap 14 capsule 0     Sig: Take 1 capsule (100 mg total) by mouth 2 (two) times daily for 7 days.        Imaging & Referrals:  None           Roger Kenney MD  6/19/2024  2:39 PM

## 2024-06-25 ENCOUNTER — HOSPITAL ENCOUNTER (OUTPATIENT)
Dept: MAMMOGRAPHY | Facility: HOSPITAL | Age: 64
Discharge: HOME | End: 2024-06-25
Attending: OBSTETRICS & GYNECOLOGY

## 2024-06-25 DIAGNOSIS — Z12.31 SCREENING MAMMOGRAM FOR BREAST CANCER: ICD-10-CM

## 2024-06-25 PROCEDURE — 77063 BREAST TOMOSYNTHESIS BI: CPT | Performed by: OBSTETRICS & GYNECOLOGY

## 2024-06-25 PROCEDURE — 77067 SCR MAMMO BI INCL CAD: CPT | Performed by: OBSTETRICS & GYNECOLOGY

## 2024-07-01 ENCOUNTER — HOSPITAL ENCOUNTER (OUTPATIENT)
Dept: MAMMOGRAPHY | Facility: HOSPITAL | Age: 64
Discharge: HOME | End: 2024-07-01
Attending: OBSTETRICS & GYNECOLOGY
Payer: COMMERCIAL

## 2024-07-01 DIAGNOSIS — R92.2 INCONCLUSIVE MAMMOGRAM: ICD-10-CM

## 2024-07-01 PROCEDURE — 77061 BREAST TOMOSYNTHESIS UNI: CPT | Performed by: OBSTETRICS & GYNECOLOGY

## 2024-07-01 PROCEDURE — 76642 ULTRASOUND BREAST LIMITED: CPT | Performed by: OBSTETRICS & GYNECOLOGY

## 2024-07-01 PROCEDURE — 77065 DX MAMMO INCL CAD UNI: CPT | Performed by: OBSTETRICS & GYNECOLOGY

## 2025-04-21 ENCOUNTER — TELEPHONE (OUTPATIENT)
Facility: CLINIC | Age: 65
End: 2025-04-21

## 2025-04-21 DIAGNOSIS — Z12.31 SCREENING MAMMOGRAM FOR BREAST CANCER: Primary | ICD-10-CM

## 2025-04-21 NOTE — TELEPHONE ENCOUNTER
Called to notify orders are placed for mammogram and are ready to schedule at patient's convenience. Patient verbalized understanding.

## 2025-05-27 ENCOUNTER — OFFICE VISIT (OUTPATIENT)
Facility: CLINIC | Age: 65
End: 2025-05-27
Payer: COMMERCIAL

## 2025-05-27 VITALS — WEIGHT: 130.81 LBS | BODY MASS INDEX: 22 KG/M2 | SYSTOLIC BLOOD PRESSURE: 106 MMHG | DIASTOLIC BLOOD PRESSURE: 62 MMHG

## 2025-05-27 DIAGNOSIS — N95.1 MENOPAUSAL STATE: ICD-10-CM

## 2025-05-27 DIAGNOSIS — Z01.419 WELL WOMAN EXAM WITH ROUTINE GYNECOLOGICAL EXAM: Primary | ICD-10-CM

## 2025-05-27 DIAGNOSIS — N63.0 BREAST NODULE: ICD-10-CM

## 2025-05-27 DIAGNOSIS — Z12.4 SCREENING FOR CERVICAL CANCER: ICD-10-CM

## 2025-05-27 PROCEDURE — 87624 HPV HI-RISK TYP POOLED RSLT: CPT

## 2025-05-27 PROCEDURE — 88175 CYTOPATH C/V AUTO FLUID REDO: CPT

## 2025-05-27 PROCEDURE — 99386 PREV VISIT NEW AGE 40-64: CPT

## 2025-05-27 RX ORDER — SEMAGLUTIDE 1 MG/.5ML
INJECTION, SOLUTION SUBCUTANEOUS
COMMUNITY
Start: 2024-09-13

## 2025-05-27 RX ORDER — MULTIVIT-MIN/IRON/FOLIC ACID/K 18-600-40
CAPSULE ORAL
COMMUNITY

## 2025-05-27 NOTE — PROGRESS NOTES
GYN H&P     Genetic questionnaire reviewed with the patient and she will be referred for genetic counseling if the questionnaire had any positive results.    The [facility] intake form was also reviewed regarding contraception, menstrual periods, urinary health, and vaginal / sexual health    2025  5:51 PM    Chief Complaint   Patient presents with    Gynecologic Exam     Annual         HPI: Nya is a 65 year old  Patient's last menstrual period was 2011.  (contraception: postmenopausal) here for her annual gyn exam.     She has no complaints. Menses absent with menopause, denies PMB or troublesome VMS. Denies any pelvic or breast complaints. Due for repeat pap - pap from 2024 unsatisfactory, negative HPV. Just had her annual exam and labwork done through her PCP about 3-4 weeks ago.    Known hx of nodule in her left breast - stable over the past few years via mammogram/ultrasound. Pt denies any changes to the area. Has mammogram scheduled for 25    Previous encounters and chart reviewed.     OB History    Para Term  AB Living   1 1 0 1  1   SAB IAB Ectopic Multiple Live Births      1 2      # Outcome Date GA Lbr Oswaldo/2nd Weight Sex Type Anes PTL Lv   1A  90 36w0d  5 lb 1 oz (2.296 kg) F NORMAL SPONT   HOME   1B  90 36w0d  5 lb 4 oz (2.381 kg) F NORMAL SPONT   DEC       GYN hx:   Menarche: 13  Use of Birth Control (if yes, specify type): Postmenopausal  Pap Date: 24  Pap Result Notes: unsat/neg 23 neg/neg 2020 Neg/Neg      ( neg/neg,  neg/neg,  neg)  Follow Up Recommendation: today      Past Medical History[1]  Past Surgical History[2]  Allergies[3]  Medications Ordered Prior to Encounter[4]  Family History[5]  Social History     Socioeconomic History    Marital status:      Spouse name: Not on file    Number of children: Not on file    Years of education: Not on file    Highest education level: Not on  file   Occupational History    Not on file   Tobacco Use    Smoking status: Never    Smokeless tobacco: Never    Tobacco comments:     never smoked   Vaping Use    Vaping status: Never Used   Substance and Sexual Activity    Alcohol use: Yes     Alcohol/week: 1.0 standard drink of alcohol     Types: 1 Glasses of wine per week     Comment: monthly    Drug use: Never    Sexual activity: Yes     Partners: Male   Other Topics Concern    Not on file   Social History Narrative    Not on file     Social Drivers of Health     Food Insecurity: No Food Insecurity (5/27/2025)    NCSS - Food Insecurity     Worried About Running Out of Food in the Last Year: No     Ran Out of Food in the Last Year: No   Transportation Needs: No Transportation Needs (5/27/2025)    NCSS - Transportation     Lack of Transportation: No   Stress: Not on file   Housing Stability: Not At Risk (5/27/2025)    NCSS - Housing/Utilities     Has Housing: Yes     Worried About Losing Housing: No     Unable to Get Utilities: No       ROS:     Review of Systems:  General: denies fevers, chills, fatigue and malaise.   Eyes: no visual changes, denies headaches  ENT: no complaints, denies earaches, runny nose, epistaxis, throat pain or sore throat  Respiratory: denies SOB, dyspnea, cough or wheezing  Cardiovascular: denies chest pain, palpitations, exercise intolerance   GI: denies abdominal pain, diarrhea, constipation  : no complaints, denies dysuria, increased urinary frequency. Menses absent, no dyspareunia   Hematological/lymphatic: denies history of excessive bleeding or bruising, denies dizziness, lightheadedness.   Breast: denies rashes, skin changes, pain, lumps or discharge   Psychiatric: denies depression, changes in sleep patterns, anxiety  Endocrine: denies hot or cold intolerance, mood changes   Neurological: denies changes in sight, smell, hearing or taste. Denies seizures or tremors  Immunological: denies allergies, denies anaphylaxis, or swollen  lymph nodes  Musculoskeletal: denies joint pain, morning stiffness, decreased range of motion         O /62   Wt 130 lb 12.8 oz (59.3 kg)   LMP 05/30/2011   BMI 22.45 kg/m²         Wt Readings from Last 6 Encounters:   05/27/25 130 lb 12.8 oz (59.3 kg)   06/19/24 166 lb (75.3 kg)   06/11/24 167 lb (75.8 kg)   06/04/24 166 lb (75.3 kg)   05/22/24 166 lb (75.3 kg)   05/18/23 177 lb (80.3 kg)     Exam:   GENERAL: well developed, well nourished, in no apparent distress, oriented.  SKIN: no rashes, no suspicious lesions  HEENT: normal  NECK: supple; no thyromegaly, no adenopathy  LUNGS: clear to auscultation  CARDIOVASCULAR: normal S1, S2, RRR  BREASTS: soft, nontender, no palpable masses or nodes, no nipple discharge, no skin changes, no axillary adenopathy  ABDOMEN: soft, non distended; non tender, no masses  PELVIC: External Genitalia: Normal appearing, no lesions.    Vagina: normal pink mucosa, no lesions, normal clear discharge.    Bladder well supported.  No  anterior or posterior hernias    Cervix: multiparous, no lesions , No CMT     Uterus: AVAF, mobile, non tender, normal size    Adnexa: non tender, no masses, normal size    Rectal: deferred  EXTREMITIES:  non tender without edema         Patient counseled on:    Diet/exercise.      Self Breast Exams             Pap: 5/2024 unsatisfactory negative HPV - collected today  GC/Chlamydia:  na  Mammogram:  scheduled for 6/24/25   Dexa:  6/2021  Colonoscopy / Cologuard:   6/2024 (q5 years)  Lipid / Cholesterol:  3-4 weeks ago per PCP     A/P: Patient is 65 year old female with no complaints. Here for well woman exam.     Meds This Visit:    Requested Prescriptions      No prescriptions requested or ordered in this encounter       1. Well woman exam with routine gynecological exam    2. Screening for cervical cancer  - Hpv High Risk , Thin Prep Collect; Future  - ThinPrep PAP Smear B; Future    3. Menopausal state    4. Breast nodule    Return in about 1 year  (around 5/27/2026) for Well Woman Exam.    Margaret Joyce, APRN   5/27/2025  5:51 PM       This note was created by [employer] voice recognition. Errors in content may be related to improper recognition by the system; efforts to review and correct have been done but errors may still exist. Please contact me with any questions.    Note to patient and family   The 21st Century Cures Act makes medical notes available to patients in the interest of transparency.  However, please be advised that this is a medical document.  It is intended as [id number] communication.  It is written in medical language which may contain abbreviations or verbiage that are technical and unfamiliar.  It may appear blunt or direct.  Medical documents are intended to carry relevant information, facts as evident, and the clinical opinion of the practitioner.           [1]   Past Medical History:   Abdominal hernia    CT scan very small hiatal hernia    Arthritis    Large toe joint replacement both feet    Bloating    Sometimes after meals    Calculus of kidney    Several kidney stone surgeries over the years    Chronic cough    Sometimes after eating    Decorative tattoo    Left wrist    Diarrhea, unspecified    Sometimes    Heartburn    After meals    Indigestion    Severe pain behind breastbone    Kidney stones    Nausea    Sometimes after meals    Sleep disturbance    Sometimes    Vomiting    Sometimes after meals    Wears glasses    Glasses   [2]   Past Surgical History:  Procedure Laterality Date    Cholecystectomy  2009    Surgery    Colonoscopy N/A 8/2/2018    Procedure: COLONOSCOPY, POSSIBLE BIOPSY, POSSIBLE POLYPECTOMY 56300;  Surgeon: MILVIA Smith MD;  Location: [facility]    Colonoscopy  06/14/2021    endoscopy also    Colonoscopy,biopsy N/A 8/6/2015    Procedure: COLONOSCOPY, POSSIBLE BIOPSY, POSSIBLE POLYPECTOMY 01502;  Surgeon: MILVIA Smith MD;  Location: [facility]    Lithotripsy Right 03/25/2021    Casey biopsy stereo nodule 2  site bilat (akc=19170/38580)  2010    BENIGN    Casey biopsy stereo nodule 2 site left (cpt=19081/12997)  2008    benign    Other surgical history Right 03/29/2021    kidney stone remnant removal    Reduction left  12/12'    Reduction right  12/12'    Removal gallbladder     [3]   Allergies  Allergen Reactions    Pseudoephedrine RASH   [4]   Current Outpatient Medications on File Prior to Visit   Medication Sig Dispense Refill    Cholecalciferol (VITAMIN D) 50 MCG (2000 UT) Oral Cap Take by mouth.      WEGOVY 1 MG/0.5ML Subcutaneous Solution Auto-injector       Multiple Vitamin (ONE-DAILY MULTI VITAMINS) Oral Tab Take 1 tablet by mouth daily.      Cyanocobalamin (VITAMIN B-12 OR) Take by mouth.      Ascorbic Acid (VITAMIN C OR) Take by mouth.      Cholecalciferol 125 MCG (5000 UT) Oral Tab Take 1 tablet (5,000 Units total) by mouth daily.       No current facility-administered medications on file prior to visit.   [5]   Family History  Problem Relation Age of Onset    Cancer Mother 63        Colon Ca    Colon Cancer Mother         63    Diabetes Mother         diabetes    Heart Attack Mother         45    Cancer Father         Bladder, Bone and Prostate Ca    Prostate Cancer Father         60    Hypertension Father         50    Hypertension Brother         50    Heart Attack Brother         60    Diabetes Paternal Grandmother         diabetes    Cancer Paternal Aunt 52        Breast CA    Breast Cancer Paternal Aunt         unknown-post menopausal    Cancer Paternal Aunt 65        Breast Ca    Breast Cancer Paternal Cousin Female 55    Cancer Paternal Cousin 55        Breast Ca

## 2025-05-28 LAB — HPV E6+E7 MRNA CVX QL NAA+PROBE: NEGATIVE

## 2025-06-26 ENCOUNTER — HOSPITAL ENCOUNTER (OUTPATIENT)
Dept: MAMMOGRAPHY | Facility: HOSPITAL | Age: 65
Discharge: HOME | End: 2025-06-26
Payer: COMMERCIAL

## 2025-06-26 DIAGNOSIS — Z12.31 SCREENING MAMMOGRAM FOR BREAST CANCER: ICD-10-CM

## 2025-06-26 PROCEDURE — 77067 SCR MAMMO BI INCL CAD: CPT

## 2025-06-26 PROCEDURE — 77063 BREAST TOMOSYNTHESIS BI: CPT

## (undated) NOTE — LETTER
Nya Rashid, :1960    CONSENT FOR PROCEDURE/SEDATION    1. I authorize the performance upon Nya Rashid  the following: Polypectomy    2. I authorize Dr. Jarrett Ly MD (and whomever is designated as the doctor’s assistant), to perform the above-mentioned procedures.    3. If any unforeseen conditions arise during this procedure calling for additional  procedures, operations, or medications (including anesthesia and blood transfusion), I further request and authorize the doctor to do whatever he/she deems advisable in my interest.    4. I consent to the taking and reproduction of any photographs in the course of this procedure for professional purposes.    5. I consent to the administration of such sedation as may be considered necessary or advisable by the physician responsible for this service, with the exception of ______________________________________________________    6. I have been informed by my doctor of the nature and purpose of this procedure sedation, possible alternative methods of treatment, risk involved and possible complications.    7. If I have a Do Not Resuscitate (DNR) order in place, the physician and I (or the individual authorized to consent on my behalf) will discuss and agree as to whether the Do Not Resuscitate (DNR) order will remain in effect or will be discontinued during the performance of the procedure and the applicable recovery period. If the Do Not Resuscitate (DNR) order is discontinued and is to be reinstated following the procedure/recovery period, the physician will determine when the applicable recovery period ends for purposes of reinstating the Do Not Resuscitate (DNR) order.    Signature of Patient:_______________________________________________    Signature of person authorized to consent for patient:  _______________________________________________________________    Relationship to patient:  ____________________________________________    Witness: _________________________________________ Date:___________     Physician Signature: _______________________________ Date:___________